# Patient Record
(demographics unavailable — no encounter records)

---

## 2025-01-03 NOTE — DISCUSSION/SUMMARY
[Patient seen for WTC Monitoring ___] : Patient was seen for WTC monitoring [unfilled] [Please See Note in Chart and Documentation in Trial DB] : Please see note in chart and documentation in Trial DB. [FreeTextEntry3] : ID [id number].  HPI: 56 yo F presents for annual visit.   PCP: Dr. 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   PMH/PSH: LUDIN not currently on CPAP, HTN, R breast biopsy reportedly "OK", L breast finding (monitored), carpal tunnel syndrome s/p surgery for R hand, plantar fasciitis, ?pre-diabetes, cholecystectomy, 12/2021 + COVID, knee pain Family Hx: mother had Lupus Allergies: see above, PCN caused a rash Meds: see above Social Hx: denies smoking  Review of Systems: IAMQ reviewed with patient  PE: see Trial DB  Results: - Imaging: CXR 3/2022 - Spirometry: reviewed  A/P: - CBC, CMP, lipids and UA ordered - Colonoscopy UTD - Mammo and pap with own provider - CXR declined this year, will have it done next year - Flu shot declined - Reviewed past labs and imaging - RTC in 1 year or sooner if needed

## 2025-01-03 NOTE — HEALTH RISK ASSESSMENT
[Patient reported mammogram was abnormal] : Patient reported mammogram was abnormal [Patient reported PAP Smear was normal] : Patient reported PAP Smear was normal [MammogramDate] : 10/2024 [PapSmearDate] : 6/2024 [ColonoscopyDate] : 7/2020 [ColonoscopyComments] : due back in 2030